# Patient Record
(demographics unavailable — no encounter records)

---

## 2025-06-24 NOTE — DISCUSSION/SUMMARY
[de-identified] : Chief complaint Back pain  HPI Patient is a very pleasant 38-year-old here for evaluation of back pain.  The patient has had back pain for many years.  He works in construction.  He was heavy objects.  Pain score is progressively worsening over the last few months.  He said his back pain for 10 years.  He has right greater than left leg pain.  His numbness and tingling down the right leg.  Goes up behind the knee to the posterolateral calf.  This numbness and tingling.  There is no fevers or chills patient denies any trauma.  Is acute on chronic issue.  No issues with gait or balance or bowel bladder incontinence.  Patient is NAD, AAOX3 skin is intact, NTTP in LS SPINE mild pain with ROM 5/5 motor strength in BLE SILT L1-S1 BLE POSITIVE straight leg raise Negative travis equal patella/achilles reflex normal gait  Imaging I reviewed 4 view x-rays lumbar spine which demonstrates L4-L5 significant disc space collapse with a superior endplate fracture at L5.  Is age-indeterminate.  There are some thoracic spondylosis as well.  Treatment note I discussed at length with the patient into the condition.  The patient presents with progressive worsening back pain and right leg pain.  The patient denies any trauma imaging today he has evidence of a superior endplate fracture at L5.  Will get an MRI to better evaluate the acuity of this fracture as well as his stenosis.  The patient will continue with physical therapy.  We discussed providing muscle relaxers and anti-inflammatories we discussed side effects of drugs including GI upset and sedation.  The patient wants a hold off until he gets the MRI.  All the patient's questions were sought and answered.